# Patient Record
Sex: MALE | Race: ASIAN | NOT HISPANIC OR LATINO
[De-identification: names, ages, dates, MRNs, and addresses within clinical notes are randomized per-mention and may not be internally consistent; named-entity substitution may affect disease eponyms.]

---

## 2019-12-16 PROBLEM — Z00.00 ENCOUNTER FOR PREVENTIVE HEALTH EXAMINATION: Status: ACTIVE | Noted: 2019-12-16

## 2020-01-28 ENCOUNTER — APPOINTMENT (OUTPATIENT)
Dept: NEUROLOGY | Facility: CLINIC | Age: 60
End: 2020-01-28
Payer: COMMERCIAL

## 2020-01-28 VITALS
DIASTOLIC BLOOD PRESSURE: 83 MMHG | BODY MASS INDEX: 23.19 KG/M2 | TEMPERATURE: 98 F | WEIGHT: 162 LBS | HEIGHT: 70 IN | SYSTOLIC BLOOD PRESSURE: 118 MMHG | HEART RATE: 74 BPM

## 2020-01-28 DIAGNOSIS — M54.40 LUMBAGO WITH SCIATICA, UNSPECIFIED SIDE: ICD-10-CM

## 2020-01-28 DIAGNOSIS — Z82.49 FAMILY HISTORY OF ISCHEMIC HEART DISEASE AND OTHER DISEASES OF THE CIRCULATORY SYSTEM: ICD-10-CM

## 2020-01-28 DIAGNOSIS — Z78.9 OTHER SPECIFIED HEALTH STATUS: ICD-10-CM

## 2020-01-28 DIAGNOSIS — Z83.3 FAMILY HISTORY OF DIABETES MELLITUS: ICD-10-CM

## 2020-01-28 DIAGNOSIS — Z82.61 FAMILY HISTORY OF ARTHRITIS: ICD-10-CM

## 2020-01-28 PROCEDURE — 99244 OFF/OP CNSLTJ NEW/EST MOD 40: CPT

## 2020-01-28 RX ORDER — MULTIVITAMIN
TABLET ORAL
Refills: 0 | Status: ACTIVE | COMMUNITY

## 2020-01-28 RX ORDER — ROSUVASTATIN CALCIUM 5 MG/1
5 TABLET, FILM COATED ORAL
Refills: 0 | Status: ACTIVE | COMMUNITY

## 2020-01-28 RX ORDER — CYANOCOBALAMIN (VITAMIN B-12) 100 MCG
TABLET ORAL
Refills: 0 | Status: ACTIVE | COMMUNITY

## 2020-01-28 NOTE — PHYSICAL EXAM
[FreeTextEntry1] : Physical examination \par General: No acute distress, Awake, Alert. \par Fundus: Unable\par Neck: no Carotid bruit. \par Cardiovascular: Normal rate, Regular rhythm, No murmur. \par Chest - clear bilaterally\par \par Mental status \par Awake, alert, and oriented to person, time and place, Normal attention span and concentration, Recent and remote memory intact, Language intact, Fund of knowledge intact. \par Cranial Nerves \par II: VFF \par III, IV, VI: PERRL, EOMI. \par V: Facial sensation is normal B/L. \par VII: Facial strength is normal B/L. \par VIII: Gross hearing is intact. \par IX, X: Palate is midline and elevates symmetrically. \par XI: Trapezius normal strength. \par XII: Tongue midline without atrophy or fasciculations. \par \par Motor exam \par Muscle tone - no evidence of rigidity or resistance in all 4 extremities. \par No atrophy or fasciculations \par Muscle Strength: arms and legs, proximal and distal flexors and extensors are normal \par No UE drift.\par \par Reflexes \par All present, normal, and symmetrical. \par Plantars right: mute. \par Plantars left: mute. \par \par Coordination \par Finger to nose: Normal. \par Heel to shin: Normal. \par \par Sensory \par Intact sensation to vibration and cold.\par \par Gait \par Normal\par

## 2020-01-28 NOTE — ASSESSMENT
[FreeTextEntry1] : Neurologic examination is essentially normal. He does need imaging as it sounds like he may have some lumbar stenosis which could be causing his discomfort. We discussed getting an EMG for his left foot pain but the patient prefers to wait on this. I encouraged him to continue doing the physical therapy exercises and to consider a yoga as part of his exercise regimen. He is not an excessive pain that prevents him from doing any type of his usual activity and thus does not need any medications at this time. If his symptoms do not improve over the next few months, I would consider pain management evaluation.

## 2020-01-28 NOTE — REASON FOR VISIT
[Consultation] : a consultation visit [FreeTextEntry1] : Numbness and pain on the left leg and foot

## 2020-01-28 NOTE — HISTORY OF PRESENT ILLNESS
[FreeTextEntry1] : This is a 59-year-old right-handed man who is being seen in neurologic consultation at the request of Dr. Carrera for evaluation of pain and numbness. Patient did fall skiing a few times last winter. He immediately noticed no problems with low back pain or leg discomfort. He has described a discomfort that begins in his left buttock and can radiate downward. Initially he was noticing a lot of pain coming from his lower back and radiating into his left groin. Dr. Carrera ordered physical therapy and heat finished it after 10 sessions. The groin pain has resolved but he continues to have left buttock discomfort. In addition to this he has left foot tingling and a burning and shooting sensation in the distal foot. He does notice pain in his right heel but this is different than what he is describing on the left. He was diagnosed with bilateral plantar fasciitis which has improved with exercises. He has noticed no loss of strength. No problems with balance. He used to run for exercise but now has switched to cycling because of pain.

## 2020-05-12 ENCOUNTER — APPOINTMENT (OUTPATIENT)
Dept: NEUROLOGY | Facility: CLINIC | Age: 60
End: 2020-05-12
Payer: COMMERCIAL

## 2020-05-12 PROCEDURE — 99442: CPT

## 2020-05-14 NOTE — HISTORY OF PRESENT ILLNESS
[Verbal consent obtained from patient] : the patient, [unfilled] [FreeTextEntry1] : Patient is complaining of pain in his right foot- specifically his heel. This has been present for at least two months.  When he tries to walk initially, he feels very sharp pain.Intense pain lasts 2-3 minutes and then it subsides and becomes a sore feeling. He has no back pain. He has no right buttock or thigh pain. His Left buttock and left foot pain improved with PT.\par He is still able to walk. Saw a podiatrist but the exercises did not help. Has had no imaging.\par Reviewed MRI report again with the patient. Per report, no evidence of nerve impingement on the right side.\par Takes Advil which helps minimally.

## 2020-05-14 NOTE — ASSESSMENT
[FreeTextEntry1] : I am not convinced of a neurologic etiology to his foot pain. I would like imaging as outlined below.\par He will try a course of Naproxen.\par Further recommendations based on test results.

## 2020-05-20 ENCOUNTER — APPOINTMENT (OUTPATIENT)
Dept: NEUROLOGY | Facility: CLINIC | Age: 60
End: 2020-05-20
Payer: COMMERCIAL

## 2020-05-20 PROCEDURE — 99441: CPT

## 2020-05-28 ENCOUNTER — APPOINTMENT (OUTPATIENT)
Dept: PODIATRY | Facility: CLINIC | Age: 60
End: 2020-05-28
Payer: COMMERCIAL

## 2020-05-28 VITALS
HEIGHT: 70 IN | SYSTOLIC BLOOD PRESSURE: 106 MMHG | WEIGHT: 162 LBS | BODY MASS INDEX: 23.19 KG/M2 | DIASTOLIC BLOOD PRESSURE: 60 MMHG

## 2020-05-28 DIAGNOSIS — M79.672 PAIN IN LEFT FOOT: ICD-10-CM

## 2020-05-28 DIAGNOSIS — M79.671 PAIN IN RIGHT FOOT: ICD-10-CM

## 2020-05-28 PROCEDURE — L3000: CPT | Mod: LT

## 2020-05-28 PROCEDURE — L3000: CPT | Mod: RT

## 2020-05-28 PROCEDURE — 99203 OFFICE O/P NEW LOW 30 MIN: CPT

## 2020-05-28 NOTE — REASON FOR VISIT
[Initial Evaluation] : an initial evaluation [FreeTextEntry1] : bilateral foot pain right greater than left

## 2020-05-28 NOTE — PROCEDURE
[FreeTextEntry1] : I had a lengthy and informative discussion with the patient today regarding plantar fasciitis. I explained to the patient that there are several etiologies as well contributing factors to this problem as well as what can aggravate it. It could include the presence or lack of of a heel spur, the type of foot type they have, the way they walk, it also could be related to the type of shoes they wear. I also made them understand that it could be a combination of all these problems together. I explained etiologies treatment options both conservative and surgical. I gave educational literature regarding this problem. Some of the treatment options as they range from conservative to aggressive include over-the-counter anti-inflammatories, prescription anti-inflammatories, custom shoes, custom orthotics, steroid injection, physical therapy, night splints, orthotripsy, and NSAIDS. There is also possible surgical intervention if all conservative measures failed to alleviate the problem. I did explain to the patient the type of shoe gear this should be wearing and gave him a copy of my plantar fascia stretching exercises with instructions to ice afterwards.\par During the evaluation and management I had a lengthy discussion with the patient regarding benefits of functional foot orthoses. I explained to the patient the etiology and treatment options and one of them included the offloading and balancing of the painful portion of the foot. I explained the importance of balancing in offloading the painful area as part of the overall treatment process to advance healing. I have asked the patient to consider this as part of the treatment\par Copy of my range of motion and strengthening exercises were distributed to the patient. I also explained that the patient must do these daily as well as ice afterwards. I also advised the type of shoe gear that would both support as well as conversely aggravate this condition. that since they are seen good results from physical therapy that they\par Patient was advised to continue formal PT because he is feeling good relief from it.\par A complete and thorough evaluation of the type of shoes they should be wearing and type of shoes for this time of year was discussed with patient.\par \par After a lengthy discussion with the patient regarding the possible benefits of orthotics and what we hope to achieve with them as it relates to their diagnosis the patient has agreed to be casted for the devices, The patient was then casted for a pair of custom functional foot orthoses with the subtalar joint in neutral, the forefoot locked, The patient was advised that they will be notified when the orthotics are returned from the laboratory, Should there be any questions or concerns they were advised to contact the office immediately, Educational literature regarding orthotics were dispensed, Included in the casting for orthotics was an overall gait exam and biomechanical evaluation\par i have dispensed a pair of heel cushions to the patient and advised the patient that accommodative support as well as elevation of both heels to help reduce symptoms\par \par \par unable to view disc supplied by patient\par d/c nsaids when done in 1 week, if sx continue or get worse will try cox2\par follow up appt 4 weeks\par

## 2020-05-28 NOTE — PHYSICAL EXAM
[General Appearance - In No Acute Distress] : in no acute distress [General Appearance - Alert] : alert [Edema] : there was no peripheral edema [Full Pulse] : the pedal pulses are present [Skin Color & Pigmentation] : normal skin color and pigmentation [Skin Turgor] : normal skin turgor [] : no rash [Deep Tendon Reflexes (DTR)] : deep tendon reflexes were 2+ and symmetric [Sensation] : the sensory exam was normal to light touch and pinprick [No Focal Deficits] : no focal deficits [FreeTextEntry1] : no bruising, no ecchymosis, no breaks in the skin, no evidence of plantar fibromas noted., no history of injury or trauma, reproducible pain upon palpation of the plantar aspect of the calcaneus without medial lateral squeeze pain, pain along the medial band of the plantar fascia and insertion of the plantar fascia to calcaneus, patient able to walk on the heels but does admit to pain, admits to post-static dyskinesia., admits to pain at the end of the day., no pain to the posterior aspect of the calcaneus, no evidence of Elke's deformity, no void felt in the Achilles tendon, patient able to walk on there toes without pain, denies numbness tingling and sharp shooting pains, no evidence of a Tinel's sign upon percussion of the posterior tibial nerve\par \par \par , right worse than left

## 2020-05-28 NOTE — HISTORY OF PRESENT ILLNESS
[FreeTextEntry1] : Location: plantar and posterior aspect of the heels, right greater than left\par Duration: 4-6 months\par Acute:\par Chronic: yes\par Past Tx: xrays, PT, (helped) last Tx about 2-3 months ago, and NSAIDS (naprosyn), seen and referred by neurology\par Exacerbated by: walking, weight bearing, +PSD\par Prior Hx:\par

## 2020-06-22 ENCOUNTER — APPOINTMENT (OUTPATIENT)
Dept: PODIATRY | Facility: CLINIC | Age: 60
End: 2020-06-22
Payer: COMMERCIAL

## 2020-06-22 VITALS — HEIGHT: 70 IN | WEIGHT: 162 LBS | BODY MASS INDEX: 23.19 KG/M2

## 2020-06-22 DIAGNOSIS — M72.2 PLANTAR FASCIAL FIBROMATOSIS: ICD-10-CM

## 2020-06-22 PROCEDURE — 99212 OFFICE O/P EST SF 10 MIN: CPT

## 2020-06-22 NOTE — PHYSICAL EXAM
[General Appearance - Alert] : alert [General Appearance - In No Acute Distress] : in no acute distress [Edema] : there was no peripheral edema [Full Pulse] : the pedal pulses are present [Skin Turgor] : normal skin turgor [Skin Color & Pigmentation] : normal skin color and pigmentation [] : no rash [Deep Tendon Reflexes (DTR)] : deep tendon reflexes were 2+ and symmetric [Sensation] : the sensory exam was normal to light touch and pinprick [No Focal Deficits] : no focal deficits [FreeTextEntry1] : notices slight decrease in sx with proper shoes and heel lifts

## 2020-06-22 NOTE — HISTORY OF PRESENT ILLNESS
[FreeTextEntry1] : Location: plantar and posterior aspect of the heels, right greater than left\par Duration: 6 months\par Chronic: yes\par Past Tx: xrays, PT, (helped) last Tx about 2-3 months ago, and NSAIDS (naprosyn), seen and referred by neurology\par Exacerbated by: walking, weight bearing, +PSD\par \par

## 2020-06-22 NOTE — PROCEDURE
[FreeTextEntry1] : Orthotic Evaluation the orthotics have been evaluated and I do feel that there is a good fit to the patient's foot and they have been made to my specifications. \par          Clinical Notes: The patient presents for dispensing of custom molded foot orthotics. I have removed the orthotics from the packaging and I have examined him. They do appear to be made as per my instructions regarding materials additions corrections. Upon placing him to the patient's foot they do appear to fit nicely. There is no material failure nor gapping. They were then trimmed to fit and placed inside the patient's shoe gear. There appears to be a good fit. Upon initial ambulation the patient has no initial complaints regarding pain off edges were tight fit. The patient did ambulate around the office for several minutes and had a favorable result. I then explained to the patient the normal break-in period. The paperwork supplied by the laboratory was reviewed with the patient. They understand a normal break-in period is to be gradual over several weeks. I've advised the patient that different, weird, and uncomfortable are certainly acceptable words in the beginning however pain, blister and callus are things that should not occur. Once the proper break-in was explained to the patient they were given an appointment to follow up.\par \par follow up prn\par

## 2020-10-01 DIAGNOSIS — M84.374G STRESS FRACTURE, RIGHT FOOT, SUBSEQUENT ENCOUNTER FOR FRACTURE WITH DELAYED HEALING: ICD-10-CM

## 2021-11-28 ENCOUNTER — HOSPITAL ENCOUNTER (EMERGENCY)
Dept: HOSPITAL 74 - FER | Age: 61
Discharge: HOME | End: 2021-11-28
Payer: COMMERCIAL

## 2021-11-28 VITALS — SYSTOLIC BLOOD PRESSURE: 118 MMHG | HEART RATE: 78 BPM | DIASTOLIC BLOOD PRESSURE: 76 MMHG

## 2021-11-28 VITALS — BODY MASS INDEX: 23.2 KG/M2

## 2021-11-28 VITALS — TEMPERATURE: 99.1 F

## 2021-11-28 DIAGNOSIS — K65.9: Primary | ICD-10-CM

## 2021-11-28 LAB
ALBUMIN SERPL-MCNC: 4.2 G/DL (ref 3.4–5)
ALP SERPL-CCNC: 52 U/L (ref 45–117)
ALT SERPL-CCNC: 19 U/L (ref 13–61)
ANION GAP SERPL CALC-SCNC: 11 MMOL/L (ref 8–16)
AST SERPL-CCNC: 18 U/L (ref 15–37)
BASOPHILS # BLD: 3.9 % (ref 0–2)
BILIRUB SERPL-MCNC: 0.7 MG/DL (ref 0.2–1)
BUN SERPL-MCNC: 11 MG/DL (ref 7–18)
CALCIUM SERPL-MCNC: 9.5 MG/DL (ref 8.5–10)
CHLORIDE SERPL-SCNC: 102 MMOL/L (ref 98–107)
CO2 SERPL-SCNC: 26 MMOL/L (ref 21–32)
CREAT SERPL-MCNC: 0.8 MG/DL (ref 0.55–1.3)
DEPRECATED RDW RBC AUTO: 12.2 % (ref 11.9–15.9)
EOSINOPHIL # BLD: 2.5 % (ref 0–4.5)
GLUCOSE SERPL-MCNC: 98 MG/DL (ref 74–106)
HCT VFR BLD CALC: 41.8 % (ref 35.4–49)
HGB BLD-MCNC: 13.6 GM/DL (ref 11.7–16.9)
LYMPHOCYTES # BLD: 22.9 % (ref 8–40)
MCH RBC QN AUTO: 29.5 PG (ref 25.7–33.7)
MCHC RBC AUTO-ENTMCNC: 32.5 G/DL (ref 32–35.9)
MCV RBC: 90.8 FL (ref 80–96)
MONOCYTES # BLD AUTO: 8.4 % (ref 3.8–10.2)
NEUTROPHILS # BLD: 62.3 % (ref 42.8–82.8)
PLATELET # BLD AUTO: 279 10^3/UL (ref 134–434)
PMV BLD: 8.9 FL (ref 7.5–11.1)
PROT SERPL-MCNC: 7 G/DL (ref 6.4–8.2)
RBC # BLD AUTO: 4.61 M/MM3 (ref 4–5.6)
SODIUM SERPL-SCNC: 139 MMOL/L (ref 136–145)
WBC # BLD AUTO: 7.1 K/MM3 (ref 4–10.8)

## 2021-12-05 ENCOUNTER — RESULT REVIEW (OUTPATIENT)
Age: 61
End: 2021-12-05

## 2022-01-04 ENCOUNTER — TRANSCRIPTION ENCOUNTER (OUTPATIENT)
Age: 62
End: 2022-01-04

## 2023-11-10 ENCOUNTER — TRANSCRIPTION ENCOUNTER (OUTPATIENT)
Age: 63
End: 2023-11-10

## 2023-11-22 ENCOUNTER — APPOINTMENT (OUTPATIENT)
Dept: PAIN MANAGEMENT | Facility: CLINIC | Age: 63
End: 2023-11-22
Payer: COMMERCIAL

## 2023-11-22 VITALS
WEIGHT: 145 LBS | DIASTOLIC BLOOD PRESSURE: 82 MMHG | HEIGHT: 70 IN | SYSTOLIC BLOOD PRESSURE: 130 MMHG | BODY MASS INDEX: 20.76 KG/M2

## 2023-11-22 PROCEDURE — 99204 OFFICE O/P NEW MOD 45 MIN: CPT

## 2023-11-22 RX ORDER — NAPROXEN SODIUM 550 MG/1
550 TABLET ORAL
Qty: 28 | Refills: 0 | Status: DISCONTINUED | COMMUNITY
Start: 2020-05-12 | End: 2023-11-22

## 2023-12-14 ENCOUNTER — TRANSCRIPTION ENCOUNTER (OUTPATIENT)
Age: 63
End: 2023-12-14

## 2024-01-05 ENCOUNTER — APPOINTMENT (OUTPATIENT)
Dept: PAIN MANAGEMENT | Facility: CLINIC | Age: 64
End: 2024-01-05
Payer: COMMERCIAL

## 2024-01-05 VITALS
WEIGHT: 145 LBS | DIASTOLIC BLOOD PRESSURE: 84 MMHG | SYSTOLIC BLOOD PRESSURE: 121 MMHG | BODY MASS INDEX: 20.76 KG/M2 | HEIGHT: 70 IN

## 2024-01-05 PROCEDURE — 99214 OFFICE O/P EST MOD 30 MIN: CPT

## 2024-01-05 PROCEDURE — G2211 COMPLEX E/M VISIT ADD ON: CPT

## 2024-01-05 NOTE — HISTORY OF PRESENT ILLNESS
[Back Pain] : back pain [___ yrs] : [unfilled] year(s) ago [Constant] : constant [5] : an average pain level of 5/10 [4] : a minimum pain level of 4/10 [6] : a maximum pain level of 6/10 [Dull] : dull [Aching] : aching [Sitting] : sitting [Bending] : bending [Other: ___] : [unfilled] [FreeTextEntry1] :  Interval Note:  Since last visit the pain is not improved.  Patient reports pain over bilateral buttock, axial, worse with transition.  Affecting adls.  Denies any additional weakness, numbness, bowel/bladder dysfunction.   HPI     Mr. MELISSA LOPEZ is a 63 year M with no significant pmhx. C/o worsening right lower back and left posterolateral leg pain. Has done PT in the past with significant relief in symptoms. Continues to do PT exercises and reports they are no longer helpful. Pain is impacting his quality of life. Take Naproxen prn for pain. Denies any additional weakness, numbness, bowel/bladder dysfunction, history of bleeding disorders.    Previous and current pain medications/doses/effects: Naproxen prn     Previous Pain Treatments: Physical therapy     Previous Pain Injections: None     Previous Diagnostic Studies/Images:  MRI LS   L4-5 mild bulge narrows inferior foramina bilaterally and contacts the undersurface of the exiting nerve roots without nerve root compression.  Mild-moderate hypertrophic facet arthropathy bilaterally.  Canal widely patent.  This level is stable  L5-S1 no canal or foraminal stenosis.  Mild facet arthropathy   DATE OF STUDY: 2020  FILE:  [id number]  :  1960  EXAMINATION: MAGNETIC RESONANCE IMAGING OF THE LUMBAR SPINE  CLINICAL HISTORY: 59-ycar-old male presenting with low back pain and a left-sided lumbar radiculopathy.  TECHNIQUE: MRI valuation of the lumbar spine was performed with a 1.5 Jodie "short borc" magnctic resonance imaging device. T1 and T2 weighted turbo spin ccho and a fat suppressed turbo  disc spaccs). Additionally, a contiguous (stack) T2 weighted turbo spin echo imaging scquence was  COMPARISON: Nonc  FINDINGS:  At the L1-L2, L2-L3 and the L3-L4 levels, the intervertebral discs appcar normal. The facct joints are intact. The ncuroforamina are patent.  At the LA-LS Icvel, there is disc desiccation, a minimal concentric disc bulge and a small left far latcral disc herniation. There is mild bilateral facct arthropathy and hypertrophy of the ligamentum flavum.  Spinal canal dimensions arc within normal limits. There is mild to moderate left-sided and mild right-sided ncural foraminal narrowing. There is mild displacement of the exiting left L4 nerve root.  At the L5-S1 level, there is disc desiccation, a minimal concentric disc bulge and a right sided posterior annulus tcar. There is mild bilateral facct arthropathy and hypertrophy of the ligamentum flavum. Spinal canal dimensions arc normal. The right and left latcral recesses are patent. There is mild loft-sided and very mild right-sided neural foraminal narrowing. There is minimal displacement of the exiting left LS nerve root.  VERTEBRAL COLUMN: There is diminution of the normal lumbar lordosis.  There is a 2.0 x 2.2 x 2.4 cm right sided L2 vertebral body hemangioma. There is no evidence of cortical destruction, bone marrow edema, vertebral body compression fracture or any evidence of a paravertebral soft tissue mass.  SPINAL CORD AND CAUDA EQUINA: The visualized inferior thoracic spinal cord appears morphologically normal. The conus medullaris of the spinal cord is situated (at the level of the pedicles of the second lumbar vertebrac). This is the lower limits of normal.  There is diminution of the normal lumbar lordosis.  IMPRESSION:  Small left foraminal disc herniation at the L4-LS Icvcl. Mild bilateral facct arthropathy at the L4-LS and the L5-S1 levcls. Multilevel bilateral neural foraminal narrowing greatest (mild to modcrate) on the left at the L4-L5 level. Dccreased lumbar lordosis. 2.4 cm L2 vertebral body hemangioma.     [FreeTextEntry7] : Lower back pain, raidatng down left leg

## 2024-01-05 NOTE — ASSESSMENT
[FreeTextEntry1] : >> Imaging and Other Studies  I personally reviewed the relevant imaging.  Discussed and explained to patient the likely source of pathology and pain.  Questions answered. MRI   >> Therapy and Other Modalities  exercises  >> Medications  acetaminophen 650mg q8h prn pain (caution <3g daily)  >> Interventions  Significant component of axial back pain secondary to lumbar spondylosis and facet arthropathy demonstrated on MRI LS.  Pain refractory to conservative treatments.  Will schedule BILATERAL L4-sacral ala diagnostic medial branch block (2 joints, 3 nerves on each side) r/b/a discussed  May consider radiofreqency ablation  >> Consults  na  >> Discussion of Risks/Benefits/Alternatives  	>Regarding any scheduled procedures:  I have discussed in detail with the patient that any interventional pain procedure is associated with potential risks.  The procedure may include an injection of steroids and potentially other medications (local anesthetic and normal saline) into the epidural space or surrounding tissue of the spine.  There are significant risks of this procedure which include and are not limited to infection, bleeding, worsening pain, dural puncture leading to postdural puncture headache, nerve damage, spinal cord injury, paralysis, stroke, and death.    There is a chance that the procedure does not improve their pain.    There are risks associated with the steroid being absorbed into the body systemically.  These include dysphoria, difficulty sleeping, mood swings and personality changes.  Premenopausal women may notice an irregularity in her menstrual cycle for 2-3 months following the injection.  Steroids can specifically affect patients with hypertension, diabetes, and peptic ulcers.  The procedure may cause a temporary increase in blood pressure and blood pressure, and may adversely affect a peptic ulcer.  Other, more rare complications, include avascular necrosis of joints, glaucoma and worsening of osteoporosis.   I have discussed the risks of the procedure at length with the patient, and the potential benefits of pain relief.  I have offered alternatives to the procedure.  All questions were answered.    The patient expressed understanding and wishes to proceed with the procedure.  	>Regarding COVID19 Pandemic:   Any planned interventional pain procedure are scheduled because further delay may cause harm or negative outcome to patient.  The goal in performing this procedure is to avoid deterioration of function, emergency room visits (which increases exposure) and reliance on opioids.    r/b/a discussed with patient, lack of evidence to conclusively determine whether pain management procedures have any positive or negative impact on the possibility of carrie the virus and/or development of any sequelae.   Patient counselled regarding timing steroid based intervention 2 weeks before or after COVID-19 vaccine administration to avoid any interaction or affect on efficacy of vaccination  Patient demonstrates understanding  Informed patient that risks associated with the COVID-19 infection.  Informed patient steps taken to limit the risks.  We are implementing safety precautions and following protocols consistent with the CDC and state recommendations. All patients and staff will be checked for fever or signs of illness upon entry to the facility. We will limit our steroid dose to the lowest effective therapeutic dose or in some cases steroids will not be injected at all.   Patient agrees to proceed  >> Conclusion  The above diagnosis and treatment plan is medically reasonable and necessary based on the patient encounter  There were no barriers to communication. Informed patient that I would be available for any additional questions. Patient was instructed to call with any worsening symptoms including severe pain, new numbness/weakness, or changes in the bowel/bladder function.  Discussed role of nsaids in pain management and all relevant risks, if patient is continuing to require after 4 weeks the patient should f/u for alternative treatment.  Instructed patient to maintain pain diary to monitor pain level, mobility, and function.

## 2024-01-05 NOTE — REASON FOR VISIT
[Initial Consultation] : an initial pain management consultation [FreeTextEntry2] : Referred by Dr. hargrove

## 2024-01-19 ENCOUNTER — APPOINTMENT (OUTPATIENT)
Dept: PAIN MANAGEMENT | Facility: CLINIC | Age: 64
End: 2024-01-19
Payer: COMMERCIAL

## 2024-01-19 VITALS
BODY MASS INDEX: 20.76 KG/M2 | OXYGEN SATURATION: 98 % | DIASTOLIC BLOOD PRESSURE: 89 MMHG | SYSTOLIC BLOOD PRESSURE: 151 MMHG | WEIGHT: 145 LBS | HEART RATE: 94 BPM | HEIGHT: 70 IN | RESPIRATION RATE: 16 BRPM

## 2024-01-19 PROCEDURE — 64494 INJ PARAVERT F JNT L/S 2 LEV: CPT | Mod: 50

## 2024-01-19 PROCEDURE — 64493 INJ PARAVERT F JNT L/S 1 LEV: CPT | Mod: 50

## 2024-01-19 RX ADMIN — LIDOCAINE HYDROCHLORIDE %: 10 INJECTION, SOLUTION INFILTRATION; PERINEURAL at 00:00

## 2024-01-19 RX ADMIN — TRIAMCINOLONE ACETONIDE 0 MG/ML: 10 INJECTION, SUSPENSION INTRA-ARTICULAR; INTRALESIONAL at 00:00

## 2024-01-19 RX ADMIN — BUPIVACAINE HYDROCHLORIDE 0 %: 7.5 INJECTION, SOLUTION EPIDURAL; INTRACAUDAL; PERINEURAL at 00:00

## 2024-01-19 NOTE — PROCEDURE
[FreeTextEntry1] : LUMBAR MEDIAL BRANCH BLOCK BILATERAL   The patient was placed in the prone position on the fluoroscopic table with a pillow under the abdomen.  Routine monitors were applied.  The back was draped in the usual sterile fashion and sterile technique was adhered to throughout the entire procedure.  The [LEFT] L4-sacral ala  levels were identified under fluoroscopic guidance.  The vertebral body end plates were aligned and the junction of the superior articular process and the base of the transverse process was brought into view using an oblique angulation of the C-arm.  The skin and subcutaneous tissues were infiltrated with 1% Lidocaine.  At all the levels except for the lumbosacral junction, a 25-gauge 3.5" spinal needle was inserted at the angle between the superior articular process and the base of the transverse process (after local anesthesia).  Oblique angulation was used to guide the needle into position.  The L5 median branch was identified at the lumbosacral junction and a 25-gauge 3.5" was guided fluoroscopically using AP view to the [LEFT ]lumbosacral junction.  1cc of 0.75% Bupivacaine with 1mg kenalog was injected at each level.    The [RIGHT] L4-sacral ala  levels were identified under fluoroscopic guidance.  The vertebral body end plates were aligned and the junction of the superior articular process and the base of the transverse process was brought into view using an oblique angulation of the C-arm.  The skin and subcutaneous tissues were infiltrated with 1% Lidocaine.  At all the levels except for the lumbosacral junction, a 25-gauge 3.5" spinal needle was inserted at the angle between the superior articular process and the base of the transverse process (after local anesthesia).  Oblique angulation was used to guide the needle into position.  The L5 median branch was identified at the lumbosacral junction and a 25-gauge 3.5" was guided fluoroscopically using AP view to the [RIGHT ]lumbosacral junction.  1cc of 0.75% Bupivacaine with 1mg kenalog was injected at each level.   The patient tolerated the procedure well.  There was no neurological deficit post procedure.  The patient went to recovery room in stable condition.  Discharge instructions were given to the patient.

## 2024-01-24 ENCOUNTER — APPOINTMENT (OUTPATIENT)
Dept: PAIN MANAGEMENT | Facility: CLINIC | Age: 64
End: 2024-01-24
Payer: COMMERCIAL

## 2024-01-24 PROCEDURE — 99212 OFFICE O/P EST SF 10 MIN: CPT | Mod: 95

## 2024-01-24 NOTE — PHYSICAL EXAM
[de-identified] :  Constitutional: Normal, well developed, no acute distress on audio/video examination Eyes: Symmetric, External structures on video examination ENT: Lips, mucosa and tongue normal on video examination Oropharynx: Lips normal, symmetric, no external lesions appreciated appreciated on video examination Respiratory: Non-labored breathing, no audible wheezes appreciated on audio/video examination Vascular: No cyanosis appreciated or edema appreciated on video examination GI:  no jaundice appreciated on video examination Neurovascular: CN grossly intact on video/audio examination, alert MSK: Normal muscle bulk on video examination

## 2024-01-24 NOTE — HISTORY OF PRESENT ILLNESS
[FreeTextEntry1] : Interval Note: sp  BILATERAL L4-sacral ala diagnostic medial branch block (2 joints, 3 nerves on each side) 24 with 80% improvement in axial back pain.  Patient reports  return of  pain over bilateral buttock, axial, worse with transition.  Affecting adls.  Denies any additional weakness, numbness, bowel/bladder dysfunction.   HPI     Mr. MELISSA LOPEZ is a 63 year M with no significant pmhx. C/o worsening right lower back and left posterolateral leg pain. Has done PT in the past with significant relief in symptoms. Continues to do PT exercises and reports they are no longer helpful. Pain is impacting his quality of life. Take Naproxen prn for pain. Denies any additional weakness, numbness, bowel/bladder dysfunction, history of bleeding disorders.    Previous and current pain medications/doses/effects: Naproxen prn     Previous Pain Treatments: Physical therapy     Previous Pain Injections:   BILATERAL L4-sacral ala diagnostic medial branch block (2 joints, 3 nerves on each side) 24     Previous Diagnostic Studies/Images:  MRI LS   L4-5 mild bulge narrows inferior foramina bilaterally and contacts the undersurface of the exiting nerve roots without nerve root compression.  Mild-moderate hypertrophic facet arthropathy bilaterally.  Canal widely patent.  This level is stable  L5-S1 no canal or foraminal stenosis.  Mild facet arthropathy   DATE OF STUDY: 2020  FILE:  [id number]  :  1960  EXAMINATION: MAGNETIC RESONANCE IMAGING OF THE LUMBAR SPINE  CLINICAL HISTORY: 59-ycar-old male presenting with low back pain and a left-sided lumbar radiculopathy.  TECHNIQUE: MRI valuation of the lumbar spine was performed with a 1.5 Jodie "short borc" magnctic resonance imaging device. T1 and T2 weighted turbo spin ccho and a fat suppressed turbo  disc spaccs). Additionally, a contiguous (stack) T2 weighted turbo spin echo imaging scquence was  COMPARISON: [facility]  FINDINGS:  At the L1-L2, L2-L3 and the L3-L4 levels, the intervertebral discs appcar normal. The facct joints are intact. The ncuroforamina are patent.  At the LA-LS Icvel, there is disc desiccation, a minimal concentric disc bulge and a small left far latcral disc herniation. There is mild bilateral facct arthropathy and hypertrophy of the ligamentum flavum.  Spinal canal dimensions arc within normal limits. There is mild to moderate left-sided and mild right-sided ncural foraminal narrowing. There is mild displacement of the exiting left L4 nerve root.  At the L5-S1 level, there is disc desiccation, a minimal concentric disc bulge and a right sided posterior annulus tcar. There is mild bilateral facct arthropathy and hypertrophy of the ligamentum flavum. Spinal canal dimensions arc normal. The right and left latcral recesses are patent. There is mild loft-sided and very mild right-sided neural foraminal narrowing. There is minimal displacement of the exiting left LS nerve root.  VERTEBRAL COLUMN: There is diminution of the normal lumbar lordosis.  There is a 2.0 x 2.2 x 2.4 cm right sided L2 vertebral body hemangioma. There is no evidence of cortical destruction, bone marrow edema, vertebral body compression fracture or any evidence of a paravertebral soft tissue mass.  SPINAL CORD AND CAUDA EQUINA: The visualized inferior thoracic spinal cord appears morphologically normal. The conus medullaris of the spinal cord is situated (at the level of the pedicles of the second lumbar vertebrac). This is the lower limits of normal.  There is diminution of the normal lumbar lordosis.  IMPRESSION:  Small left foraminal disc herniation at the L4-LS Icvcl. Mild bilateral facct arthropathy at the L4-LS and the L5-S1 levcls. Multilevel bilateral neural foraminal narrowing greatest (mild to modcrate) on the left at the L4-L5 level. Dccreased lumbar lordosis. 2.4 cm L2 vertebral body hemangioma.     [Home] : at home, [unfilled] , at the time of the visit. [Medical Office: ([facility])___] : at the medical office located in  [Verbal consent obtained from patient] : the patient, [unfilled] [Back Pain] : back pain [___ yrs] : [unfilled] year(s) ago [Constant] : constant [5] : an average pain level of 5/10 [4] : a minimum pain level of 4/10 [6] : a maximum pain level of 6/10 [Dull] : dull [Aching] : aching [Sitting] : sitting [Bending] : bending [Other: ___] : [unfilled] [FreeTextEntry7] : Lower back pain, raidatng down left leg

## 2024-01-24 NOTE — ASSESSMENT
[FreeTextEntry1] : >> Imaging and Other Studies  I personally reviewed the relevant imaging.  Discussed and explained to patient the likely source of pathology and pain.  Questions answered. MRI   >> Therapy and Other Modalities  exercises  >> Medications  acetaminophen 650mg q8h prn pain (caution <3g daily)  >> Interventions  Significant component of axial back pain secondary to lumbar spondylosis and facet arthropathy demonstrated on MRI LS.  Pain refractory to conservative treatments.  sp BILATERAL L4-sacral ala diagnostic medial branch block (2 joints, 3 nerves on each side) with 80% improvement  given efficacy of previous intervention that is >80% improvement in pain and improved ability to perform adls, and return of pain despite conservative treatment, will schedule repeat  BILATERAL L4-sacral ala diagnostic medial branch block (2 joints, 3 nerves on each side) r/b/a discussed  May consider radiofreqency ablation  >> Consults  na  >> Discussion of Risks/Benefits/Alternatives  	>Regarding any scheduled procedures:  I have discussed in detail with the patient that any interventional pain procedure is associated with potential risks.  The procedure may include an injection of steroids and potentially other medications (local anesthetic and normal saline) into the epidural space or surrounding tissue of the spine.  There are significant risks of this procedure which include and are not limited to infection, bleeding, worsening pain, dural puncture leading to postdural puncture headache, nerve damage, spinal cord injury, paralysis, stroke, and death.    There is a chance that the procedure does not improve their pain.    There are risks associated with the steroid being absorbed into the body systemically.  These include dysphoria, difficulty sleeping, mood swings and personality changes.  Premenopausal women may notice an irregularity in her menstrual cycle for 2-3 months following the injection.  Steroids can specifically affect patients with hypertension, diabetes, and peptic ulcers.  The procedure may cause a temporary increase in blood pressure and blood pressure, and may adversely affect a peptic ulcer.  Other, more rare complications, include avascular necrosis of joints, glaucoma and worsening of osteoporosis.   I have discussed the risks of the procedure at length with the patient, and the potential benefits of pain relief.  I have offered alternatives to the procedure.  All questions were answered.    The patient expressed understanding and wishes to proceed with the procedure.  	>Regarding COVID19 Pandemic:   Any planned interventional pain procedure are scheduled because further delay may cause harm or negative outcome to patient.  The goal in performing this procedure is to avoid deterioration of function, emergency room visits (which increases exposure) and reliance on opioids.    r/b/a discussed with patient, lack of evidence to conclusively determine whether pain management procedures have any positive or negative impact on the possibility of carrie the virus and/or development of any sequelae.   Patient counselled regarding timing steroid based intervention 2 weeks before or after COVID-19 vaccine administration to avoid any interaction or affect on efficacy of vaccination  Patient demonstrates understanding  Informed patient that risks associated with the COVID-19 infection.  Informed patient steps taken to limit the risks.  We are implementing safety precautions and following protocols consistent with the CDC and state recommendations. All patients and staff will be checked for fever or signs of illness upon entry to the facility. We will limit our steroid dose to the lowest effective therapeutic dose or in some cases steroids will not be injected at all.   Patient agrees to proceed  >> Conclusion  The above diagnosis and treatment plan is medically reasonable and necessary based on the patient encounter  There were no barriers to communication. Informed patient that I would be available for any additional questions. Patient was instructed to call with any worsening symptoms including severe pain, new numbness/weakness, or changes in the bowel/bladder function.  Discussed role of nsaids in pain management and all relevant risks, if patient is continuing to require after 4 weeks the patient should f/u for alternative treatment.  Instructed patient to maintain pain diary to monitor pain level, mobility, and function.

## 2024-03-05 ENCOUNTER — APPOINTMENT (OUTPATIENT)
Dept: PAIN MANAGEMENT | Facility: CLINIC | Age: 64
End: 2024-03-05
Payer: COMMERCIAL

## 2024-03-05 VITALS
HEART RATE: 73 BPM | SYSTOLIC BLOOD PRESSURE: 129 MMHG | DIASTOLIC BLOOD PRESSURE: 82 MMHG | RESPIRATION RATE: 16 BRPM | OXYGEN SATURATION: 97 % | HEIGHT: 70 IN | BODY MASS INDEX: 20.76 KG/M2 | WEIGHT: 145 LBS

## 2024-03-05 PROCEDURE — 64494 INJ PARAVERT F JNT L/S 2 LEV: CPT | Mod: 50

## 2024-03-05 PROCEDURE — 64493 INJ PARAVERT F JNT L/S 1 LEV: CPT | Mod: 50

## 2024-03-05 RX ADMIN — LIDOCAINE HYDROCHLORIDE %: 10 INJECTION, SOLUTION INFILTRATION; PERINEURAL at 00:00

## 2024-03-05 RX ADMIN — TRIAMCINOLONE ACETONIDE 0 MG/ML: 10 INJECTION, SUSPENSION INTRA-ARTICULAR; INTRALESIONAL at 00:00

## 2024-03-05 RX ADMIN — BUPIVACAINE HYDROCHLORIDE 0 %: 7.5 INJECTION, SOLUTION EPIDURAL; RETROBULBAR at 00:00

## 2024-03-07 ENCOUNTER — APPOINTMENT (OUTPATIENT)
Dept: PAIN MANAGEMENT | Facility: CLINIC | Age: 64
End: 2024-03-07
Payer: COMMERCIAL

## 2024-03-07 DIAGNOSIS — M54.16 RADICULOPATHY, LUMBAR REGION: ICD-10-CM

## 2024-03-07 DIAGNOSIS — M47.817 SPONDYLOSIS W/OUT MYELOPATHY OR RADICULOPATHY, LUMBOSACRAL REGION: ICD-10-CM

## 2024-03-07 DIAGNOSIS — M79.18 MYALGIA, OTHER SITE: ICD-10-CM

## 2024-03-07 DIAGNOSIS — M79.2 NEURALGIA AND NEURITIS, UNSPECIFIED: ICD-10-CM

## 2024-03-07 PROCEDURE — 99214 OFFICE O/P EST MOD 30 MIN: CPT | Mod: 95

## 2024-03-07 NOTE — ASSESSMENT
[FreeTextEntry1] : >> Imaging and Other Studies  I personally reviewed the relevant imaging.  Discussed and explained to patient the likely source of pathology and pain.  Questions answered. MRI   >> Therapy and Other Modalities  exercises  >> Medications  acetaminophen 650mg q8h prn pain (caution <3g daily)  >> Interventions  Significant component of back and leg pain likely secondary to lumbar spinal stenosis demonstrated on MRI LS.  Will schedule L5-S1 interlaminar epidural steroid injection r/b/a discussed   Significant component of axial back pain secondary to lumbar spondylosis and facet arthropathy demonstrated on MRI LS.  Pain refractory to conservative treatments.  sp BILATERAL L4-sacral ala diagnostic medial branch block (2 joints, 3 nerves on each side) with 80% improvement x2    May consider radiofreqency ablation  >> Consults  na  >> Discussion of Risks/Benefits/Alternatives  	>Regarding any scheduled procedures:  I have discussed in detail with the patient that any interventional pain procedure is associated with potential risks.  The procedure may include an injection of steroids and potentially other medications (local anesthetic and normal saline) into the epidural space or surrounding tissue of the spine.  There are significant risks of this procedure which include and are not limited to infection, bleeding, worsening pain, dural puncture leading to postdural puncture headache, nerve damage, spinal cord injury, paralysis, stroke, and death.    There is a chance that the procedure does not improve their pain.    There are risks associated with the steroid being absorbed into the body systemically.  These include dysphoria, difficulty sleeping, mood swings and personality changes.  Premenopausal women may notice an irregularity in her menstrual cycle for 2-3 months following the injection.  Steroids can specifically affect patients with hypertension, diabetes, and peptic ulcers.  The procedure may cause a temporary increase in blood pressure and blood pressure, and may adversely affect a peptic ulcer.  Other, more rare complications, include avascular necrosis of joints, glaucoma and worsening of osteoporosis.   I have discussed the risks of the procedure at length with the patient, and the potential benefits of pain relief.  I have offered alternatives to the procedure.  All questions were answered.    The patient expressed understanding and wishes to proceed with the procedure.   > Longitudinal management of Complex Painful condition   The patient is being managed for a complex condition that requires ongoing management.  The nature of this condition demands nuanced approach to treatment.  The seriousness of the condition necessitates an in-depth and focused approach to management and coordination with other healthcare professionals.    This visit involves intricate evaluation and management of the patient's condition.  The complexity of the visit was due to the need for detailed assessment of the current state, consideration of potential complications and a careful balancing of treatment options to management the chronic condition effectively.   As detailed above, the patient has a chronic significant painful condition that requires regular and detailed management.  The condition's impact on the patient's quality of life and health is substantial and necessitates a comprehensive and tailored approach   >> Conclusion   There were no barriers to communication. Informed patient that I would be available for any additional questions. Patient was instructed to call with any worsening symptoms including severe pain, new numbness/weakness, or changes in the bowel/bladder function. Discussed role of nsaids in pain management and all relevant risks, if patient is continuing to require after 4 weeks the patient should f/u for alternative treatment. Instructed patient to maintain pain diary to monitor pain level, mobility, and function.  I explained to patient benefits and limitation of TeleMedicine visits  Patient understands that limitations include inability to perform comprehensive physical exam, which may lead to potential diagnostic inconsistencies.    Any scheduled procedures are based on history, imaging and limited physical exam performed on TeleHealth visit.  If necessary, additional focal physical exam will be performed on date of procedure  Patient understands that diagnosis and treatment may be limited by these inconsistencies and patient agrees to proceed with care plan

## 2024-03-07 NOTE — HISTORY OF PRESENT ILLNESS
[FreeTextEntry1] : Interval Note:  sp  BILATERAL L4-sacral ala diagnostic medial branch block (2 joints, 3 nerves on each side) 3/5/24 with 75% improvement in axial back pain.    Patient reports pain that has returned, but also some radiculopathy radiating to bilateral buttock and left leg.   Affecting adls.  Denies any additional weakness, numbness, bowel/bladder dysfunction.   HPI     Mr. MELISSA LOEPZ is a 63 year M with no significant pmhx. C/o worsening right lower back and left posterolateral leg pain. Has done PT in the past with significant relief in symptoms. Continues to do PT exercises and reports they are no longer helpful. Pain is impacting his quality of life. Take Naproxen prn for pain. Denies any additional weakness, numbness, bowel/bladder dysfunction, history of bleeding disorders.    Previous and current pain medications/doses/effects: Naproxen prn     Previous Pain Treatments: Physical therapy     Previous Pain Injections:  BILATERAL L4-sacral ala diagnostic medial branch block (2 joints, 3 nerves on each side) 3/5/24 with 75% improvement in axial back pain.   BILATERAL L4-sacral ala diagnostic medial branch block (2 joints, 3 nerves on each side) 24     Previous Diagnostic Studies/Images:  MRI LS   L4-5 mild bulge narrows inferior foramina bilaterally and contacts the undersurface of the exiting nerve roots without nerve root compression.  Mild-moderate hypertrophic facet arthropathy bilaterally.  Canal widely patent.  This level is stable  L5-S1 no canal or foraminal stenosis.  Mild facet arthropathy   DATE OF STUDY: 2020  FILE:  [id number]  :  1960  EXAMINATION: MAGNETIC RESONANCE IMAGING OF THE LUMBAR SPINE  CLINICAL HISTORY: 59-ycar-old male presenting with low back pain and a left-sided lumbar radiculopathy.  TECHNIQUE: MRI valuation of the lumbar spine was performed with a 1.5 Jodie "short borc" magnctic resonance imaging device. T1 and T2 weighted turbo spin ccho and a fat suppressed turbo  disc spaccs). Additionally, a contiguous (stack) T2 weighted turbo spin echo imaging scquence was  COMPARISON: [facility]  FINDINGS:  At the L1-L2, L2-L3 and the L3-L4 levels, the intervertebral discs appcar normal. The facct joints are intact. The ncuroforamina are patent.  At the LA-LS Icvel, there is disc desiccation, a minimal concentric disc bulge and a small left far latcral disc herniation. There is mild bilateral facct arthropathy and hypertrophy of the ligamentum flavum.  Spinal canal dimensions arc within normal limits. There is mild to moderate left-sided and mild right-sided ncural foraminal narrowing. There is mild displacement of the exiting left L4 nerve root.  At the L5-S1 level, there is disc desiccation, a minimal concentric disc bulge and a right sided posterior annulus tcar. There is mild bilateral facct arthropathy and hypertrophy of the ligamentum flavum. Spinal canal dimensions arc normal. The right and left latcral recesses are patent. There is mild loft-sided and very mild right-sided neural foraminal narrowing. There is minimal displacement of the exiting left LS nerve root.  VERTEBRAL COLUMN: There is diminution of the normal lumbar lordosis.  There is a 2.0 x 2.2 x 2.4 cm right sided L2 vertebral body hemangioma. There is no evidence of cortical destruction, bone marrow edema, vertebral body compression fracture or any evidence of a paravertebral soft tissue mass.  SPINAL CORD AND CAUDA EQUINA: The visualized inferior thoracic spinal cord appears morphologically normal. The conus medullaris of the spinal cord is situated (at the level of the pedicles of the second lumbar vertebrac). This is the lower limits of normal.  There is diminution of the normal lumbar lordosis.  IMPRESSION:  Small left foraminal disc herniation at the L4-LS Icvcl. Mild bilateral facct arthropathy at the L4-LS and the L5-S1 levcls. Multilevel bilateral neural foraminal narrowing greatest (mild to modcrate) on the left at the L4-L5 level. Dccreased lumbar lordosis. 2.4 cm L2 vertebral body hemangioma.     [Back Pain] : back pain [___ yrs] : [unfilled] year(s) ago [Constant] : constant [5] : an average pain level of 5/10 [4] : a minimum pain level of 4/10 [6] : a maximum pain level of 6/10 [Dull] : dull [Aching] : aching [Sitting] : sitting [Bending] : bending [Other: ___] : [unfilled] [FreeTextEntry7] : Lower back pain, raidatng down left leg [Home] : at home, [unfilled] , at the time of the visit. [Medical Office: ([facility])___] : at the medical office located in  [Verbal consent obtained from patient] : the patient, [unfilled]

## 2024-03-07 NOTE — PHYSICAL EXAM
[de-identified] :  Constitutional: Normal, well developed, no acute distress on audio/video examination Eyes: Symmetric, External structures on video examination ENT: Lips, mucosa and tongue normal on video examination Oropharynx: Lips normal, symmetric, no external lesions appreciated appreciated on video examination Respiratory: Non-labored breathing, no audible wheezes appreciated on audio/video examination Vascular: No cyanosis appreciated or edema appreciated on video examination GI:  no jaundice appreciated on video examination Neurovascular: CN grossly intact on video/audio examination, alert MSK: Normal muscle bulk on video examination

## 2024-03-26 ENCOUNTER — TRANSCRIPTION ENCOUNTER (OUTPATIENT)
Age: 64
End: 2024-03-26